# Patient Record
Sex: FEMALE | Race: WHITE
[De-identification: names, ages, dates, MRNs, and addresses within clinical notes are randomized per-mention and may not be internally consistent; named-entity substitution may affect disease eponyms.]

---

## 2018-03-16 ENCOUNTER — HOSPITAL ENCOUNTER (EMERGENCY)
Dept: HOSPITAL 47 - EC | Age: 33
Discharge: HOME | End: 2018-03-16
Payer: COMMERCIAL

## 2018-03-16 VITALS — SYSTOLIC BLOOD PRESSURE: 120 MMHG | HEART RATE: 90 BPM | DIASTOLIC BLOOD PRESSURE: 76 MMHG | RESPIRATION RATE: 16 BRPM

## 2018-03-16 VITALS — TEMPERATURE: 98.7 F

## 2018-03-16 DIAGNOSIS — F17.200: ICD-10-CM

## 2018-03-16 DIAGNOSIS — Z79.899: ICD-10-CM

## 2018-03-16 DIAGNOSIS — Z88.0: ICD-10-CM

## 2018-03-16 DIAGNOSIS — F41.9: Primary | ICD-10-CM

## 2018-03-16 PROCEDURE — 99283 EMERGENCY DEPT VISIT LOW MDM: CPT

## 2018-03-16 NOTE — ED
General Adult HPI





- General


Chief complaint: Anxiety


Stated complaint: Anxiety


Time Seen by Provider: 03/16/18 13:13


Source: patient, RN notes reviewed


Mode of arrival: ambulatory


Limitations: no limitations





- History of Present Illness


Initial comments: 





Patient is a 33-year-old female with significant past medical history for 

anxiety, presenting today with a chief complaint of possible withdrawals.  She 

states that she's been trying to wean off of her Xanax.  She is been followed 

the family doctor for this.  She states she was taking 1 mg twice a day.  She 

states this past month she was taking 0.5 mg twice a day.  She states she ran 

out taking her last dose yesterday morning.  She states she was worried and 

noticed that she was having increased anxiety today.  States she was also 

worried because she wouldn't be able to get new prescription until Tuesday is 4 

days away.  Patient states she just found out that the pharmacy does have her 

prescription that she is able to  now.  She states she still feeling a 

little anxious which is symptoms are consistent with anxiety that she's had in 

the past.  She states she's been dealing with this since the age of 17.  She 

states she feels hot and cold sweating.  She denies any new symptoms.  She 

denies any other complaints. Patient denies any recent fever, chills, shortness 

of breath, chest pain, back pain, constipation or diarrhea, headaches or visual 

changes, or any other complaints. 





- Related Data


 Home Medications











 Medication  Instructions  Recorded  Confirmed


 


ALPRAZolam [Xanax] 0.75 mg PO DAILY PRN 03/16/18 03/16/18


 


Nicotine 14Mg/24Hr Patch [Habitrol 1 patch TRANSDERM DAILY 03/16/18 03/16/18





14Mg/24Hr Patch]   











 Allergies











Allergy/AdvReac Type Severity Reaction Status Date / Time


 


Penicillins Allergy  Rash/Hives Verified 03/16/18 13:01














Review of Systems


ROS Statement: 


Those systems with pertinent positive or pertinent negative responses have been 

documented in the HPI.





ROS Other: All systems not noted in ROS Statement are negative.





Past Medical History


Past Medical History: No Reported History


History of Any Multi-Drug Resistant Organisms: None Reported


Past Surgical History: No Surgical Hx Reported


Additional Past Surgical History / Comment(s): polyps removed prior to IVF


Past Anesthesia/Blood Transfusion Reactions: No Reported Reaction


Past Psychological History: Anxiety


Smoking Status: Current some day smoker


Past Alcohol Use History: None Reported


Past Drug Use History: None Reported





- Past Family History


  ** Father


History Unknown: Yes


Family Medical History: Hypertension





  ** Mother


Family Medical History: Thyroid Disorder





General Exam





- General Exam Comments


Initial Comments: 





General:  The patient is awake and alert, in no distress, and does not appear 

acutely ill. 


Eye:  Pupils are equal, round and reactive to light, extra-ocular movements are 

intact.  No nystagmus.  There is normal conjunctiva bilaterally.  No signs of 

icterus.  


Ears, nose, mouth and throat:  There are moist mucous membranes and no oral 

lesions. 


Neck:  The neck is supple  


Musculoskeletal:  Normal ROM, no tenderness.  Strength 5/5. Sensation intact. 

Pulses equal bilaterally 2+.  


Neurological:  A&O x 3. CN II-XII intact, There are no obvious motor or sensory 

deficits. Coordination appears grossly intact. Speech is normal.


Skin:  Skin is warm and dry and no rashes or lesions are noted. 


Psychiatric:  Cooperative, appropriate mood & affect, normal judgment.  


Limitations: no limitations





Course


 Vital Signs











  03/16/18





  12:53


 


Temperature 98.7 F


 


Pulse Rate 118 H


 


Respiratory 20





Rate 


 


Blood Pressure 138/104


 


O2 Sat by Pulse 99





Oximetry 














Medical Decision Making





- Medical Decision Making





Patient reexamined at this time shows no signs of distress.  She is feeling 

much better after Xanax that was given here in the emergency room.  At this 

time patient will be discharged home and advised follow family doctor.





Disposition


Clinical Impression: 


 Acute anxiety





Disposition: HOME SELF-CARE


Condition: Good


Additional Instructions: 


Please use medication as discussed.  Please follow-up with family doctor in the 

next 2 days of symptoms have not improved.  Please return to emergency room if 

the symptoms increase or worsen or for any other concerns.


Referrals: 


None,Stated [Primary Care Provider] - 1-2 days


Time of Disposition: 14:12

## 2020-10-09 ENCOUNTER — HOSPITAL ENCOUNTER (EMERGENCY)
Dept: HOSPITAL 47 - EC | Age: 35
Discharge: HOME | End: 2020-10-09
Payer: COMMERCIAL

## 2020-10-09 VITALS
SYSTOLIC BLOOD PRESSURE: 132 MMHG | RESPIRATION RATE: 16 BRPM | DIASTOLIC BLOOD PRESSURE: 78 MMHG | TEMPERATURE: 97.8 F | HEART RATE: 78 BPM

## 2020-10-09 DIAGNOSIS — R42: ICD-10-CM

## 2020-10-09 DIAGNOSIS — Z79.899: ICD-10-CM

## 2020-10-09 DIAGNOSIS — F17.200: ICD-10-CM

## 2020-10-09 DIAGNOSIS — Z88.0: ICD-10-CM

## 2020-10-09 DIAGNOSIS — H93.12: ICD-10-CM

## 2020-10-09 DIAGNOSIS — H91.92: Primary | ICD-10-CM

## 2020-10-09 DIAGNOSIS — F41.9: ICD-10-CM

## 2020-10-09 PROCEDURE — 99283 EMERGENCY DEPT VISIT LOW MDM: CPT

## 2020-10-09 PROCEDURE — 70450 CT HEAD/BRAIN W/O DYE: CPT

## 2020-10-09 NOTE — ED
ENT HPI





- General


Chief complaint: ENT


Stated complaint: sudden hearing loss


Time Seen by Provider: 10/09/20 15:15


Source: patient, RN notes reviewed


Mode of arrival: ambulatory


Limitations: no limitations





- History of Present Illness


Initial comments: 





This a 35-year-old female presents emergency Department with chief complaint of 

difficulty hearing.  Patient states he started with a whooshing sound of her 

left ear she was seen by her PCP was given some eardrops.  Patient states that 

she was scheduled to see Dr. Dominguez but symptoms worsen so she follow-up with

Dr. Veliz yesterday in which she ruled out any signs of infection, fluid or 

any other acute abnormality.  Patient was referred to neurology and which are 

appointments and December.  He instructed the patient that she would most likely

need a CAT scan.  Patient that she woke up today with a sudden loss of hearing 

states that she can hear but seems to be decreased.  Patient states that she is 

also having some ongoing vertigo-type symptoms.  Patient's had no extreme 

headaches no blurred vision no focal weakness.  Patient has a known pituitary 

tumor states that has not been bothersome.  She denies any trauma to her ear she

doesn't that she uses Q-tips.  Patient was offered Antivert though patient 

states that she was told to make her drowsy so she declined.





- Related Data


                                Home Medications











 Medication  Instructions  Recorded  Confirmed


 


ALPRAZolam [Xanax] 0.75 mg PO DAILY PRN 03/16/18 03/16/18


 


Nicotine 14Mg/24Hr Patch [Habitrol 1 patch TRANSDERM DAILY 03/16/18 03/16/18





14Mg/24Hr Patch]   








                                  Previous Rx's











 Medication  Instructions  Recorded


 


predniSONE 10 mg PO AS DIRECTED #30 tab 10/09/20











                                    Allergies











Allergy/AdvReac Type Severity Reaction Status Date / Time


 


Penicillins Allergy  Rash/Hives Verified 10/09/20 15:10














Review of Systems


ROS Statement: 


Those systems with pertinent positive or pertinent negative responses have been 

documented in the HPI.





ROS Other: All systems not noted in ROS Statement are negative.





Past Medical History


Past Medical History: No Reported History


History of Any Multi-Drug Resistant Organisms: None Reported


Past Surgical History: No Surgical Hx Reported


Additional Past Surgical History / Comment(s): polyps removed prior to IVF


Past Anesthesia/Blood Transfusion Reactions: No Reported Reaction


Past Psychological History: Anxiety


Smoking Status: Current every day smoker


Past Alcohol Use History: None Reported


Past Drug Use History: None Reported





- Past Family History


  ** Father


History Unknown: Yes


Family Medical History: Hypertension





  ** Mother


Family Medical History: Thyroid Disorder





General Exam


Limitations: no limitations


General appearance: alert, in no apparent distress


Head exam: Present: atraumatic, normocephalic, normal inspection


Eye exam: Present: normal appearance, PERRL, EOMI.  Absent: scleral icterus, 

conjunctival injection, periorbital swelling


ENT exam: Present: normal exam, normal oropharynx, mucous membranes moist, TM's 

normal bilaterally


Neck exam: Present: normal inspection, full ROM.  Absent: tenderness, 

meningismus, lymphadenopathy


Respiratory exam: Present: normal lung sounds bilaterally.  Absent: respiratory 

distress, wheezes, rales, rhonchi, stridor


Cardiovascular Exam: Present: regular rate, normal rhythm, normal heart sounds. 

Absent: systolic murmur, diastolic murmur, rubs, gallop, clicks


Neurological exam: Present: alert, oriented X3, CN II-XII intact, reflexes 

normal.  Absent: motor sensory deficit


Psychiatric exam: Present: normal affect, normal mood


Skin exam: Present: warm, dry, intact, normal color.  Absent: rash





Course


                                   Vital Signs











  10/09/20





  15:06


 


Temperature 98.5 F


 


Pulse Rate 117 H


 


Respiratory 18





Rate 


 


Blood Pressure 137/88


 


O2 Sat by Pulse 99





Oximetry 














Medical Decision Making





- Medical Decision Making





35-year-old female presented for left ear issues.  She has recently seen Dr. Veliz is been referred to neurology.  Patient had decreasing hearing today.  

She's been referred to urine loss specialist.   is concerned as she was 

told she may need steroids.  Patient has no obvious cause for hearing loss she 

was instructed she does need a MRI and will follow-up.  Patient placed on 

steroids at this time return parameters were discussed.  Attempted to call ENT 

Dr. Veliz





Disposition


Clinical Impression: 


 Decreased hearing of left ear, Tinnitus, Vertigo





Disposition: HOME SELF-CARE


Condition: Stable


Instructions (If sedation given, give patient instructions):  Hearing Loss (ED)


Additional Instructions: 


Please return to the Emergency Department if symptoms worsen or any other 

concerns.


Prescriptions: 


predniSONE 10 mg PO AS DIRECTED #30 tab


Is patient prescribed a controlled substance at d/c from ED?: No


Referrals: 


Bonilla Ramos MD [Primary Care Provider] - 1-2 days


Joe Veliz MD [STAFF PHYSICIAN] - 1-2 days


Time of Disposition: 17:33

## 2020-10-09 NOTE — CT
EXAMINATION TYPE: CT brain wo con

 

DATE OF EXAM: 10/9/2020

 

COMPARISON: None

 

HISTORY: Left ear pain. Whooshing sound and heartbeat sound in left ear yesterday. Loss of hearing to
 same ear today. History of pituitary tumor.

 

CT DLP: 1094.4 mGycm

Automated exposure control for dose reduction was used.

 

Ventricles have normal size. There is no mass effect nor midline shift. There is no sign of intracran
ial hemorrhage. Calvarium is intact. Skull base is intact.

 

IMPRESSION:

Negative unenhanced head CT scan.

## 2021-03-20 ENCOUNTER — HOSPITAL ENCOUNTER (OUTPATIENT)
Dept: HOSPITAL 47 - RADMRIMAIN | Age: 36
End: 2021-03-20
Attending: PODIATRIST
Payer: COMMERCIAL

## 2021-03-20 DIAGNOSIS — M25.474: Primary | ICD-10-CM

## 2021-03-20 PROCEDURE — 73720 MRI LWR EXTREMITY W/O&W/DYE: CPT

## 2021-03-20 NOTE — MR
EXAMINATION TYPE: MR foot RT wo/w con

 

DATE OF EXAM: 3/20/2021

 

COMPARISON: None

 

HISTORY: Soft tissue mass, base of great toe.

 

CONTRAST: 

Standard multiplanar, multisequence MRI departmental protocol utilizing 7 mL intravenous Gadavist linda
olinium contrast.  

 

The metatarsals are intact. The toes appear intact. There is slight increased joint fluid at the firs
t MP joint. There is minimal subcutaneous edema on the dorsal aspect of the first MP joint. I see no 
bony destructive process. There is rounded area of fluid signal measuring 7 mm at the subcutaneous pl
pawan aspect of the distal shaft of the first metatarsal. This is adjacent to the flexor tendon. The 
contrast images show no pathologic enhancement.

 

IMPRESSION:

Superficial rounded fluid collection at the plantar aspect of the distal first metatarsal could be a 
synovial cyst.

 

Minimal effusion of the first MP joint. No fracture seen.